# Patient Record
Sex: MALE | Race: WHITE | NOT HISPANIC OR LATINO | URBAN - METROPOLITAN AREA
[De-identification: names, ages, dates, MRNs, and addresses within clinical notes are randomized per-mention and may not be internally consistent; named-entity substitution may affect disease eponyms.]

---

## 2020-11-20 ENCOUNTER — TELEPHONE ENCOUNTER (OUTPATIENT)
Dept: URBAN - METROPOLITAN AREA CLINIC 35 | Facility: CLINIC | Age: 60
End: 2020-11-20

## 2021-01-02 ENCOUNTER — WEB ENCOUNTER (OUTPATIENT)
Dept: URBAN - METROPOLITAN AREA CLINIC 35 | Facility: CLINIC | Age: 61
End: 2021-01-02

## 2021-01-20 ENCOUNTER — OFFICE VISIT (OUTPATIENT)
Dept: URBAN - METROPOLITAN AREA CLINIC 31 | Facility: CLINIC | Age: 61
End: 2021-01-20

## 2021-02-03 ENCOUNTER — OFFICE VISIT (OUTPATIENT)
Dept: URBAN - METROPOLITAN AREA CLINIC 31 | Facility: CLINIC | Age: 61
End: 2021-02-03

## 2021-02-21 ENCOUNTER — WEB ENCOUNTER (OUTPATIENT)
Dept: URBAN - METROPOLITAN AREA CLINIC 35 | Facility: CLINIC | Age: 61
End: 2021-02-21

## 2021-02-24 ENCOUNTER — OFFICE VISIT (OUTPATIENT)
Dept: URBAN - METROPOLITAN AREA CLINIC 31 | Facility: CLINIC | Age: 61
End: 2021-02-24

## 2021-02-24 VITALS
WEIGHT: 185 LBS | DIASTOLIC BLOOD PRESSURE: 78 MMHG | HEIGHT: 67 IN | BODY MASS INDEX: 29.03 KG/M2 | OXYGEN SATURATION: 98 % | HEART RATE: 95 BPM | SYSTOLIC BLOOD PRESSURE: 125 MMHG

## 2021-02-24 PROBLEM — 428283002 HISTORY OF POLYP OF COLON: Status: ACTIVE | Noted: 2021-02-24

## 2021-02-24 PROBLEM — 396331005 CELIAC DISEASE: Status: ACTIVE | Noted: 2021-02-24

## 2021-02-24 RX ORDER — ATORVASTATIN CALCIUM 10 MG/1
1 TABLET TABLET, FILM COATED ORAL ONCE A DAY
Qty: 30 | Status: ACTIVE | COMMUNITY

## 2021-02-24 RX ORDER — SODIUM, POTASSIUM,MAG SULFATES 17.5-3.13G
ML AS DIRECTED SOLUTION, RECONSTITUTED, ORAL ORAL
Qty: 1 KIT | Refills: 0 | OUTPATIENT
Start: 2021-02-24

## 2021-02-24 NOTE — HPI-MIGRATED HPI
;   ;     Heartburn : Patient admits longstanding history of heartburn. Patient admits taking Prilosec which was weaned off 6 months ago. Patient admits taking TUMS OTC as needed.  Patient admits that coffee, acidic foods or fruits will worsen his symptoms. Patient does admit that he has a history of Schatzki ring and Hiatal hernia.  Patient denies dysphagia, excessive belching, globus, sour eructations, indigestion, early satiety, changes in appetite, coughing, abdominal/epigastric pain, or changes in bowel habits.   Patient denies family hx of esophageal cancer or diseases.  Patient admits past EGD () Schatzki ring, Hiatal hernia  ;   Surveillance Colonoscopy : 59 y/o male patient presents today for a consultation for a surveillance colonoscopy. Patient currently admits  1 formed bowel movements per day with no melena, mucus  or blood in stools.  Patient admits occasional bloating, gas, and  heartburn.   Patient admits having a Colonoscopy in () Polyp findings. Patient currently has celiac disease.  Patient admits his maternal aunt  of colon cancer. Patient's father and son diagnosed with celiac disease as well.   ;

## 2021-03-09 ENCOUNTER — TELEPHONE ENCOUNTER (OUTPATIENT)
Dept: URBAN - METROPOLITAN AREA CLINIC 35 | Facility: CLINIC | Age: 61
End: 2021-03-09

## 2021-03-09 RX ORDER — SODIUM PICOSULFATE, MAGNESIUM OXIDE, AND ANHYDROUS CITRIC ACID 10; 3.5; 12 MG/160ML; G/160ML; G/160ML
ML LIQUID ORAL AS DIRECTED
Qty: 1 | Refills: 0 | OUTPATIENT
Start: 2021-03-09

## 2021-03-23 ENCOUNTER — OFFICE VISIT (OUTPATIENT)
Dept: URBAN - METROPOLITAN AREA SURGERY CENTER 8 | Facility: SURGERY CENTER | Age: 61
End: 2021-03-23

## 2021-04-14 ENCOUNTER — OFFICE VISIT (OUTPATIENT)
Dept: URBAN - METROPOLITAN AREA CLINIC 31 | Facility: CLINIC | Age: 61
End: 2021-04-14

## 2021-04-21 ENCOUNTER — DASHBOARD ENCOUNTERS (OUTPATIENT)
Age: 61
End: 2021-04-21

## 2021-04-21 ENCOUNTER — OFFICE VISIT (OUTPATIENT)
Dept: URBAN - METROPOLITAN AREA CLINIC 31 | Facility: CLINIC | Age: 61
End: 2021-04-21

## 2021-04-21 VITALS
SYSTOLIC BLOOD PRESSURE: 120 MMHG | DIASTOLIC BLOOD PRESSURE: 80 MMHG | BODY MASS INDEX: 28.25 KG/M2 | HEART RATE: 82 BPM | HEIGHT: 67 IN | OXYGEN SATURATION: 97 % | WEIGHT: 180 LBS

## 2021-04-21 RX ORDER — SODIUM PICOSULFATE, MAGNESIUM OXIDE, AND ANHYDROUS CITRIC ACID 10; 3.5; 12 MG/160ML; G/160ML; G/160ML
ML LIQUID ORAL AS DIRECTED
Qty: 1 | Refills: 0 | Status: ON HOLD | COMMUNITY
Start: 2021-03-09

## 2021-04-21 RX ORDER — SODIUM, POTASSIUM,MAG SULFATES 17.5-3.13G
ML AS DIRECTED SOLUTION, RECONSTITUTED, ORAL ORAL
Qty: 1 KIT | Refills: 0 | Status: ACTIVE | COMMUNITY
Start: 2021-02-24

## 2021-04-21 RX ORDER — ATORVASTATIN CALCIUM 10 MG/1
1 TABLET TABLET, FILM COATED ORAL ONCE A DAY
Qty: 30 | Status: ACTIVE | COMMUNITY

## 2021-04-21 NOTE — HPI-MIGRATED HPI
;   ;   ;   ;     Heartburn : Patient admits longstanding history of heartburn. Patient admits taking TUMS OTC as needed. Patient denies any relief of symptoms. Patient would like to discuss going back to Prilosec for relief of symptoms.    Last visit (2021) Patient admits longstanding history of heartburn. Patient admits taking Prilosec which was weaned off 6 months ago. Patient admits taking TUMS OTC as needed.  Patient admits that coffee, acidic foods or fruits will worsen his symptoms. Patient does admit that he has a history of Schatzki ring and Hiatal hernia.  Patient denies dysphagia, excessive belching, globus, sour eructations, indigestion, early satiety, changes in appetite, coughing, abdominal/epigastric pain, or changes in bowel habits.   Patient denies family hx of esophageal cancer or diseases.  Patient admits past EGD () Schatzki ring, Hiatal hernia;   Follow up- EGD : Patient presents today for follow up to his/her EGD which was completed on  (2021) by Dr. Stephane Payton.  Patient denies any complications after his procedure. Since the procedure patient denies dysphagia, globus, changes in appetite, and changes in bowel habits.  Patient admits to having heartburn.  EGD report shows: 1. Erythematous mucosa in the antrum and gastric body. Biopsied.  2. LA Grade C reflux esophagitis with no bleeding. Biopsied. ;   Colonoscopy Follow-Up : Patient presents today for follow up to his colonoscopy which was completed on  (2021) by Dr. Stephane Payton.  Patient denies any complications after his procedure. Patient currently admits 1 formed bowel movementsevery other day.  Patient denies any melena, blood or mucus in stools. Patient denies any associated abdominal pain, bloating, or gas.   Colonoscopy report shows: Three 3 to 4 mm polyps in the rectum, removed with a cold biopsy forceps.         ;   Surveillance Colonoscopy :      Last visit (2021) 61 y/o male patient presents today for a consultation for a surveillance colonoscopy. Patient currently admits  1 formed bowel movements per day with no melena, mucus  or blood in stools.  Patient admits occasional bloating, gas, and  heartburn.   Patient admits having a Colonoscopy in () Polyp findings. Patient currently has celiac disease.  Patient admits his maternal aunt  of colon cancer. Patient's father and son diagnosed with celiac disease as well.;

## 2021-04-22 PROBLEM — 196731005 GASTRODUODENITIS: Status: ACTIVE | Noted: 2021-04-22

## 2024-04-18 ENCOUNTER — RX ONLY (RX ONLY)
Age: 64
End: 2024-04-18

## 2024-04-25 ENCOUNTER — RX ONLY (RX ONLY)
Age: 64
End: 2024-04-25

## 2025-03-14 ENCOUNTER — APPOINTMENT (OUTPATIENT)
Age: 65
Setting detail: DERMATOLOGY
End: 2025-03-14

## 2025-03-14 DIAGNOSIS — D18.0 HEMANGIOMA: ICD-10-CM

## 2025-03-14 DIAGNOSIS — L81.4 OTHER MELANIN HYPERPIGMENTATION: ICD-10-CM

## 2025-03-14 DIAGNOSIS — L82.1 OTHER SEBORRHEIC KERATOSIS: ICD-10-CM

## 2025-03-14 DIAGNOSIS — B07.8 OTHER VIRAL WARTS: ICD-10-CM

## 2025-03-14 DIAGNOSIS — D22 MELANOCYTIC NEVI: ICD-10-CM

## 2025-03-14 PROBLEM — D18.01 HEMANGIOMA OF SKIN AND SUBCUTANEOUS TISSUE: Status: ACTIVE | Noted: 2025-03-14

## 2025-03-14 PROBLEM — D22.5 MELANOCYTIC NEVI OF TRUNK: Status: ACTIVE | Noted: 2025-03-14

## 2025-03-14 PROCEDURE — ? COUNSELING

## 2025-03-14 PROCEDURE — ? ADDITIONAL NOTES

## 2025-03-14 PROCEDURE — 99213 OFFICE O/P EST LOW 20 MIN: CPT

## 2025-03-14 ASSESSMENT — LOCATION DETAILED DESCRIPTION DERM
LOCATION DETAILED: LEFT CLAVICULAR NECK
LOCATION DETAILED: RIGHT CLAVICULAR NECK
LOCATION DETAILED: STERNUM
LOCATION DETAILED: RIGHT SUPERIOR MEDIAL SCROTUM
LOCATION DETAILED: PERIUMBILICAL SKIN
LOCATION DETAILED: RIGHT AXILLARY VAULT

## 2025-03-14 ASSESSMENT — LOCATION ZONE DERM
LOCATION ZONE: AXILLAE
LOCATION ZONE: TRUNK
LOCATION ZONE: NECK
LOCATION ZONE: SCROTUM

## 2025-03-14 ASSESSMENT — LOCATION SIMPLE DESCRIPTION DERM
LOCATION SIMPLE: ABDOMEN
LOCATION SIMPLE: CHEST
LOCATION SIMPLE: RIGHT AXILLARY VAULT
LOCATION SIMPLE: LEFT ANTERIOR NECK
LOCATION SIMPLE: RIGHT ANTERIOR NECK
LOCATION SIMPLE: RIGHT SCROTUM

## 2025-03-14 NOTE — PROCEDURE: ADDITIONAL NOTES
Render Risk Assessment In Note?: no
Additional Notes: test spot(s) per FH; tx'ed w/ LN2, pt tolerated well (NO CHARGE)
Detail Level: Simple